# Patient Record
Sex: FEMALE | Race: WHITE
[De-identification: names, ages, dates, MRNs, and addresses within clinical notes are randomized per-mention and may not be internally consistent; named-entity substitution may affect disease eponyms.]

---

## 2017-04-29 ENCOUNTER — HOSPITAL ENCOUNTER (EMERGENCY)
Dept: HOSPITAL 45 - C.EDB | Age: 57
Discharge: HOME | End: 2017-04-29
Payer: COMMERCIAL

## 2017-04-29 VITALS
BODY MASS INDEX: 25.47 KG/M2 | HEIGHT: 69.02 IN | BODY MASS INDEX: 25.47 KG/M2 | WEIGHT: 171.96 LBS | WEIGHT: 171.96 LBS | HEIGHT: 69.02 IN

## 2017-04-29 VITALS — HEART RATE: 79 BPM | OXYGEN SATURATION: 96 % | SYSTOLIC BLOOD PRESSURE: 130 MMHG | DIASTOLIC BLOOD PRESSURE: 78 MMHG

## 2017-04-29 VITALS — OXYGEN SATURATION: 95 %

## 2017-04-29 VITALS — TEMPERATURE: 98.96 F

## 2017-04-29 DIAGNOSIS — R07.89: Primary | ICD-10-CM

## 2017-04-29 DIAGNOSIS — Z79.899: ICD-10-CM

## 2017-04-29 DIAGNOSIS — G89.29: ICD-10-CM

## 2017-04-29 DIAGNOSIS — Z82.49: ICD-10-CM

## 2017-04-29 DIAGNOSIS — M06.9: ICD-10-CM

## 2017-04-29 DIAGNOSIS — F17.200: ICD-10-CM

## 2017-04-29 DIAGNOSIS — N18.3: ICD-10-CM

## 2017-04-29 DIAGNOSIS — Z98.890: ICD-10-CM

## 2017-04-29 DIAGNOSIS — Z79.82: ICD-10-CM

## 2017-04-29 DIAGNOSIS — Z87.11: ICD-10-CM

## 2017-04-29 DIAGNOSIS — M54.9: ICD-10-CM

## 2017-04-29 DIAGNOSIS — Z79.52: ICD-10-CM

## 2017-04-29 LAB
ALP SERPL-CCNC: 67 U/L (ref 45–117)
ALT SERPL-CCNC: 21 U/L (ref 12–78)
ANION GAP SERPL CALC-SCNC: 6 MMOL/L (ref 3–11)
AST SERPL-CCNC: 15 U/L (ref 15–37)
BASOPHILS # BLD: 0.03 K/UL (ref 0–0.2)
BASOPHILS NFR BLD: 0.2 %
BUN SERPL-MCNC: 19 MG/DL (ref 7–18)
BUN/CREAT SERPL: 12.6 (ref 10–20)
CALCIUM SERPL-MCNC: 9.6 MG/DL (ref 8.5–10.1)
CHLORIDE SERPL-SCNC: 103 MMOL/L (ref 98–107)
CKMB/CK RATIO: 1.9 (ref 0–3)
CO2 SERPL-SCNC: 30 MMOL/L (ref 21–32)
COMPLETE: YES
CREAT CL PREDICTED SERPL C-G-VRATE: 44.8 ML/MIN
CREAT SERPL-MCNC: 1.5 MG/DL (ref 0.6–1.2)
EOSINOPHIL NFR BLD AUTO: 398 K/UL (ref 130–400)
GLUCOSE SERPL-MCNC: 114 MG/DL (ref 70–99)
HCT VFR BLD CALC: 45.7 % (ref 37–47)
IG%: 0.2 %
IMM GRANULOCYTES NFR BLD AUTO: 7.8 %
LYMPHOCYTES # BLD: 1.39 K/UL (ref 1.2–3.4)
MCH RBC QN AUTO: 30.7 PG (ref 25–34)
MCHC RBC AUTO-ENTMCNC: 32.8 G/DL (ref 32–36)
MCV RBC AUTO: 93.5 FL (ref 80–100)
MONOCYTES NFR BLD: 3.3 %
NEUTROPHILS # BLD AUTO: 0.4 %
NEUTROPHILS NFR BLD AUTO: 88.1 %
PMV BLD AUTO: 10.1 FL (ref 7.4–10.4)
POINT OF CARE TROPONIN I: 0 NG/ML (ref 0–0.04)
POTASSIUM SERPL-SCNC: 4.2 MMOL/L (ref 3.5–5.1)
PREG INTERNAL NEGATIVE QC: (no result)
PREG INTERNAL POSITIVE QC: (no result)
RBC # BLD AUTO: 4.89 M/UL (ref 4.2–5.4)
SODIUM SERPL-SCNC: 139 MMOL/L (ref 136–145)
WBC # BLD AUTO: 17.93 K/UL (ref 4.8–10.8)

## 2017-04-29 NOTE — DIAGNOSTIC IMAGING REPORT
CHEST CTA for AORTIC DISSECTION



CT DOSE: 595.23 mGy.cm



HISTORY: Right upper chest pain.     



TECHNIQUE: Multiaxial CT images of the chest were performed both before and

after the intravenous administration of contrast to evaluate the aorta. Maximal

intensity projection images were also obtained.



COMPARISON STUDY: Chest 4/29/2017.



FINDINGS: Normal caliber thoracic aorta with no evidence for dissection. Mild

atherosclerotic plaque within the thoracic aorta. The central pulmonary arteries

are patent. No pleural or pericardial effusions. The visualized liver, spleen,

and adrenal glands are unremarkable. Cholecystectomy. An 11 mm hypodense lesion

within the upper pole the left kidney. This favors a cyst. No mediastinal or

hilar lymphadenopathy. No fractures within the visualized osseous structures.

Approximately 80% stenosis at the proximal left subclavian artery due to the

atherosclerotic plaque. No pneumothorax. The central airways are patent. Mild

emphysema. A 7 mm groundglass nodule within the left upper lobe on image 142.

Groundglass densities at the lung bases favor mild dependent change. No focal

lung consolidations to suggest pneumonia. There are 2 adjacent 3 mm nodules

within the right lower lobe on image 171.



IMPRESSION: 



1. No evidence for an aortic dissection.

2. Mild emphysema.

3. A few subcentimeter indeterminate pulmonary nodules as described above.

Dominant 7 mm groundglass nodule seen within the left upper lobe. Please refer

to the chart below for recommended follow-up.

4. Approximately 80% stenosis at the proximal left subclavian artery due to the

atherosclerotic plaque. 



Please refer to below summary of Fleischner criteria recommendations for

follow-up of incidental CT nodules (SHANTELL Drake, Guidelines for management of

small pulmonary nodules detected on CT scans:  A statement from the Fleischner

Society, Radiology 237: [phone].)



SOLID NODULES



Solitary nodule size: <6 mm

*  Low risk patients:  no follow-up needed

*  high risk patients:  optional CT at 12 months



Solitary nodule size:  6-8 mm

*  Low risk patients:  follow-up at 6-12 months, then consider further follow-up

at 18-24 months

*  high risk patients:  initial follow-up CT at 6-12 months and then at 18-24

months if no change



Solitary nodule size: >8 mm

*  either low or high risk patients - consider follow-up CT at 3 months, and/or

CT-PET, and/or biopsy



Multiple nodules size:  <6 mm

*  Low risk patients:  no routine follow-up

*  high risk patients:  optional CT at 12 months



Multiple nodules size:  6-8 mm

*  Low risk patients:  follow-up at 3-6 months, then consider further follow-up

at 18-24 months

*  high risk patients:  follow-up at 3-6 months, then at 18-24 months if no

change



Multiple nodules size:  >8 mm

*  Low risk patients:  follow-up at 3-6 months, then consider further follow-up

at 18-24 months

*  high risk patients:  follow-up at 3-6 months, then at 18-24 months if no

change



Note:  newly detected indeterminate nodule in persons 35 years of age or older.

*  Low risk patients:  minimal or absent history of smoking and/or other known

risk factors

*  high risk patients:  history of smoking or of other known risk factors (e.g.

first degree relative with lung cancer, or exposure to asbestos, radon, uranium)

*  if a nodule up to 8 mm is partly solid or is ground glass further follow-up

is required after 24 months to exclude possible slow growing adenocarcinoma

(GALINDO)



SUBSOLID NODULES



Solitary pure ground-glass nodule

*  nodule size <6 mm - no CT follow-up required

*  nodule size >=6 mm - follow-up CT at 6-12 months, then every 2 years until 5

years



Solitary part-solid nodule

*  nodule size <6 mm - no CT follow-up required

*  nodule size >=6 mm - follow-up CT at 3-6 months.  If unchanged, and solid

component remains <6 mm, then annual follow-up for 5 years



Multiple subsolid nodules

*  nodule size <6 mm - follow-up CT at 3-6 months, consider further follow-up at

2 and 4 years if stable

*  nodule size >=6 mm - follow-up CT at 3-6 months, subsequent management based

on the most suspicious nodule(s)







       







Electronically signed by:  Jim Martinez M.D.

4/29/2017 5:15 PM



Dictated Date/Time:  4/29/2017 5:06 PM

## 2017-04-29 NOTE — DIAGNOSTIC IMAGING REPORT
CHEST ONE VIEW PORTABLE



HISTORY: Atypical  CHEST PAIN



COMPARISON: Chest 2/7/2015.



FINDINGS: The lungs are clear. Cardiac silhouette is normal in size. No pleural

effusions. No pneumothorax.



IMPRESSION:

No acute process.







Electronically signed by:  Jim Martinez M.D.

4/29/2017 3:15 PM



Dictated Date/Time:  4/29/2017 3:14 PM

## 2017-04-29 NOTE — EMERGENCY ROOM VISIT NOTE
History


Report prepared by Shruthi:  Minoo Chopra


Under the Supervision of:  Dr. Cecilio Richardson M.D.


First contact with patient:  14:58


Chief Complaint:  CHEST PAIN


Stated Complaint:  CHEST PAIN RADIATIING IN BACK RIGHT SHOULDER AND N


Nursing Triage Summary:  


Chest pain radiating through to back.  Patient was seen at [facility] ER on 


Thursday. The pain has been present since Wednesday and it has really never 


really gone away.





History of Present Illness


The patient is a 56 year old female who presents to the Emergency Room with 

complaints of constant right-sided chest pain for the past 4 days. She was 

evaluated 2 days ago in the ER at [facility] for this pain. She had 

negative testing for blood clots and was diagnosed with indigestion and 

discharged. She states that her pain has continued since that time. It has not 

worsened, but has been persistent at about a 7/10 in severity. Her pain is 

exacerbated with deep breathing and lying flat. She states that her pain goes 

into her back and radiates up the right side of her neck. She has never 

experienced pain like this before. She was given Toradol while in [place] 

and states that helped to alleviate her pain for a short time. She takes 40mg 

of oxycodone daily for chronic back pain. Pt denies LOC, headache, fevers, 

chills, diaphoresis, visual changes, tearing pain radiating to the back, 

personal history or family history of aneurysm or pulmonary embolism, 

uncontrolled hypertension, breathing difficulties, leg swelling, coagulation 

abnormalities, prolonged travel, recent surgery or immobilization, nausea, 

vomiting, abdominal pain, melena, hematochezia, urinary symptoms, numbness, 

weakness, lymphadenopathy, rash, or other complaints.





   Source of History:  patient, family (daughter)


   Onset:  4 days ago


   Position:  chest (right)


   Symptom Intensity:  7/10


   Quality:  other (radiating)


   Timing:  constant


   Modifying Factors (Worsening):  breathing, other (lying flat)


   Modifying Factors (Relieving):  other (Toradol)





Review of Systems


See HPI for pertinent positives and negatives.  A total of ten systems were 

reviewed and were otherwise negative.





Past Medical & Surgical


Medical Problems:


(1) Abdominal aortic aneurysm without rupture


(2) C. difficile colitis


(3) Chronic headache disorder


(4) Chronic renal failure, stage 3 (moderate)


(5) COPD exacerbation


(6) Diverticulosis of colon without diverticulitis


(7) History of - peptic ulcer


(8) Hypercholesterolemia


(9) L5 disc disease


(10) Lumbar vertebral fracture


(11) Nicotine dependence


(12) Rheumatoid arthritis


Surgical Problems:


(1) History of endovascular stent graft for abdominal aortic aneurysm (AAA)








Family History





Cancer


Gallbladder disease


Heart disease


Hypertension


Lung disease





Social History


Smoking Status:  Current Every Day Smoker


Alcohol Use:  none


Drug Use:  none


Marital Status:  


Housing Status:  lives with family


Occupation Status:  employed





Current/Historical Medications


Scheduled


Aspirin (Aspirin Ec), 81 MG PO DAILY


Ginger (Zingiber Officinalis) (Ginger Root), 1,100 MG PO DAILY


Lisinopril (Zestril), 5 MG PO DAILY


Oxycodone Hcl (Oxycodone Hcl), 1 TAB PO QID


Prednisone (Prednisone), 10 MG PO DAILY


Ranitidine (Zantac), 150 MG PO DAILY





Allergies


Coded Allergies:  


     Codeine (Verified  Allergy, Severe, Difficulty Breathing, 4/29/17)


     Adalimumab (Verified  Allergy, Intermediate, Rash, 4/29/17)


     Penicillins (Verified  Allergy, Intermediate, Rash, 4/29/17)





Physical Exam


Vital Signs











  Date Time  Temp Pulse Resp B/P Pulse Ox O2 Delivery O2 Flow Rate FiO2


 


4/29/17 17:42  79 18 130/78 96 Room Air  


 


4/29/17 16:05  90 20 114/74 95 Room Air  


 


4/29/17 15:20     95 Room Air  


 


4/29/17 14:33  94      


 


4/29/17 14:32     94 Room Air  


 


4/29/17 14:24     95 Room Air  


 


4/29/17 14:15 37.2 97 20 148/84 95 Room Air  











Physical Exam


GENERAL: Awake, alert, well-appearing, in no distress


HENT: Normocephalic, atraumatic. Oropharynx unremarkable.


EYES: Normal conjunctiva. Sclera non-icteric.


NECK: Supple. No nuchal rigidity. FROM. No JVD.


RESPIRATORY: Clear to auscultation.


CARDIAC: Regular rate, normal rhythm. Extremities warm and well perfused. 

Pulses equal.


ABDOMEN: Soft, non-distended. No tenderness to palpation. No rebound or 

guarding. No masses.


RECTAL: Deferred.


MUSCULOSKELETAL: Chest examination reveals no tenderness. The back is 

symmetrical on inspection without obvious abnormality. There is no CVA 

tenderness to palpation. No joint edema. Sore to touch over the right posterior 

lateral ribs. 


LOWER EXTREMITIES: Calves are equal size bilaterally and non-tender. No edema. 

No discoloration. 


NEURO: Normal sensorium. No sensory or motor deficits noted. 


SKIN: No rash or jaundice noted.





Medical Decision & Procedures


ER Provider


Diagnostic Interpretation:


Radiology results as stated below per my review and radiologist interpretation:





CHEST ONE VIEW PORTABLE





HISTORY: Atypical  CHEST PAIN





COMPARISON: Chest 2/7/2015.





FINDINGS: The lungs are clear. Cardiac silhouette is normal in size. No pleural


effusions. No pneumothorax.





IMPRESSION:


No acute process.











Electronically signed by:  Jim Martinez M.D.


4/29/2017 3:15 PM





Dictated Date/Time:  4/29/2017 3:14 PM








CHEST CTA for AORTIC DISSECTION





CT DOSE: 595.23 mGy.cm





HISTORY: Right upper chest pain.     





TECHNIQUE: Multiaxial CT images of the chest were performed both before and


after the intravenous administration of contrast to evaluate the aorta. Maximal


intensity projection images were also obtained.





COMPARISON STUDY: Chest 4/29/2017.





FINDINGS: Normal caliber thoracic aorta with no evidence for dissection. Mild


atherosclerotic plaque within the thoracic aorta. The central pulmonary arteries


are patent. No pleural or pericardial effusions. The visualized liver, spleen,


and adrenal glands are unremarkable. Cholecystectomy. An 11 mm hypodense lesion


within the upper pole the left kidney. This favors a cyst. No mediastinal or


hilar lymphadenopathy. No fractures within the visualized osseous structures.


Approximately 80% stenosis at the proximal left subclavian artery due to the


atherosclerotic plaque. No pneumothorax. The central airways are patent. Mild


emphysema. A 7 mm groundglass nodule within the left upper lobe on image 142.


Groundglass densities at the lung bases favor mild dependent change. No focal


lung consolidations to suggest pneumonia. There are 2 adjacent 3 mm nodules


within the right lower lobe on image 171.





IMPRESSION: 





1. No evidence for an aortic dissection.


2. Mild emphysema.


3. A few subcentimeter indeterminate pulmonary nodules as described above.


Dominant 7 mm groundglass nodule seen within the left upper lobe. Please refer


to the chart below for recommended follow-up.


4. Approximately 80% stenosis at the proximal left subclavian artery due to the


atherosclerotic plaque. 





Please refer to below summary of Fleischner criteria recommendations for


follow-up of incidental CT nodules (SHANTELL Drake, Guidelines for management of


small pulmonary nodules detected on CT scans:  A statement from the Fleischner


Society, Radiology 237: [phone].)





SOLID NODULES





Solitary nodule size: <6 mm


*  Low risk patients:  no follow-up needed


*  high risk patients:  optional CT at 12 months





Solitary nodule size:  6-8 mm


*  Low risk patients:  follow-up at 6-12 months, then consider further follow-up


at 18-24 months


*  high risk patients:  initial follow-up CT at 6-12 months and then at 18-24


months if no change





Solitary nodule size: >8 mm


*  either low or high risk patients - consider follow-up CT at 3 months, and/or


CT-PET, and/or biopsy





Multiple nodules size:  <6 mm


*  Low risk patients:  no routine follow-up


*  high risk patients:  optional CT at 12 months





Multiple nodules size:  6-8 mm


*  Low risk patients:  follow-up at 3-6 months, then consider further follow-up


at 18-24 months


*  high risk patients:  follow-up at 3-6 months, then at 18-24 months if no


change





Multiple nodules size:  >8 mm


*  Low risk patients:  follow-up at 3-6 months, then consider further follow-up


at 18-24 months


*  high risk patients:  follow-up at 3-6 months, then at 18-24 months if no


change





Note:  newly detected indeterminate nodule in persons 35 years of age or older.


*  Low risk patients:  minimal or absent history of smoking and/or other known


risk factors


*  high risk patients:  history of smoking or of other known risk factors (e.g.


first degree relative with lung cancer, or exposure to asbestos, radon, uranium)


*  if a nodule up to 8 mm is partly solid or is ground glass further follow-up


is required after 24 months to exclude possible slow growing adenocarcinoma


(BAC)





SUBSOLID NODULES





Solitary pure ground-glass nodule


*  nodule size <6 mm - no CT follow-up required


*  nodule size >=6 mm - follow-up CT at 6-12 months, then every 2 years until 5


years





Solitary part-solid nodule


*  nodule size <6 mm - no CT follow-up required


*  nodule size >=6 mm - follow-up CT at 3-6 months.  If unchanged, and solid


component remains <6 mm, then annual follow-up for 5 years





Multiple subsolid nodules


*  nodule size <6 mm - follow-up CT at 3-6 months, consider further follow-up at


2 and 4 years if stable


*  nodule size >=6 mm - follow-up CT at 3-6 months, subsequent management based


on the most suspicious nodule(s)











       











Electronically signed by:  Jim Martinez M.D.


4/29/2017 5:15 PM





Dictated Date/Time:  4/29/2017 5:06 PM





Laboratory Results


4/29/17 14:30








Red Blood Count 4.89, Mean Corpuscular Volume 93.5, Mean Corpuscular Hemoglobin 

30.7, Mean Corpuscular Hemoglobin Concent 32.8, Mean Platelet Volume 10.1, 

Neutrophils (%) (Auto) 88.1, Lymphocytes (%) (Auto) 7.8, Monocytes (%) (Auto) 

3.3, Eosinophils (%) (Auto) 0.4, Basophils (%) (Auto) 0.2, Neutrophils # (Auto) 

15.81, Lymphocytes # (Auto) 1.39, Monocytes # (Auto) 0.59, Eosinophils # (Auto) 

0.07, Basophils # (Auto) 0.03





4/29/17 14:30

















Test


  4/29/17


14:30 4/29/17


15:03


 


White Blood Count


  17.93 K/uL


(4.8-10.8) 


 


 


Red Blood Count


  4.89 M/uL


(4.2-5.4) 


 


 


Hemoglobin


  15.0 g/dL


(12.0-16.0) 


 


 


Hematocrit 45.7 % (37-47)  


 


Mean Corpuscular Volume


  93.5 fL


() 


 


 


Mean Corpuscular Hemoglobin


  30.7 pg


(25-34) 


 


 


Mean Corpuscular Hemoglobin


Concent 32.8 g/dl


(32-36) 


 


 


Platelet Count


  398 K/uL


(130-400) 


 


 


Mean Platelet Volume


  10.1 fL


(7.4-10.4) 


 


 


Neutrophils (%) (Auto) 88.1 %  


 


Lymphocytes (%) (Auto) 7.8 %  


 


Monocytes (%) (Auto) 3.3 %  


 


Eosinophils (%) (Auto) 0.4 %  


 


Basophils (%) (Auto) 0.2 %  


 


Neutrophils # (Auto)


  15.81 K/uL


(1.4-6.5) 


 


 


Lymphocytes # (Auto)


  1.39 K/uL


(1.2-3.4) 


 


 


Monocytes # (Auto)


  0.59 K/uL


(0.11-0.59) 


 


 


Eosinophils # (Auto)


  0.07 K/uL


(0-0.5) 


 


 


Basophils # (Auto)


  0.03 K/uL


(0-0.2) 


 


 


RDW Standard Deviation


  48.3 fL


(36.4-46.3) 


 


 


RDW Coefficient of Variation


  14.2 %


(11.5-14.5) 


 


 


Immature Granulocyte % (Auto) 0.2 %  


 


Immature Granulocyte # (Auto)


  0.04 K/uL


(0.00-0.02) 


 


 


Anion Gap


  6.0 mmol/L


(3-11) 


 


 


Est Creatinine Clear Calc


Drug Dose 44.8 ml/min 


  


 


 


Estimated GFR (


American) 45.3 


  


 


 


Estimated GFR (Non-


American 39.1 


  


 


 


BUN/Creatinine Ratio 12.6 (10-20)  


 


Calcium Level


  9.6 mg/dl


(8.5-10.1) 


 


 


Total Bilirubin


  0.4 mg/dl


(0.2-1) 


 


 


Direct Bilirubin


  < 0.1 mg/dl


(0-0.2) 


 


 


Aspartate Amino Transf


(AST/SGOT) 15 U/L (15-37) 


  


 


 


Alanine Aminotransferase


(ALT/SGPT) 21 U/L (12-78) 


  


 


 


Alkaline Phosphatase


  67 U/L


() 


 


 


Total Creatine Kinase


  74 U/L


() 


 


 


Creatine Kinase MB


  1.4 ng/ml


(0.5-3.6) 


 


 


Creatine Kinase MB Ratio 1.9 (0-3.0)  


 


Total Protein


  7.5 gm/dl


(6.4-8.2) 


 


 


Albumin


  3.7 gm/dl


(3.4-5.0) 


 


 


Lipase


  292 U/L


() 


 


 


Human Chorionic Gonadotropin,


Qual NEG (NEG) 


  


 


 


Bedside D-Dimer


  


  > 450 ng/mlFEU


(0-450)


 


Bedside Troponin I


  


  0.000 ng/ml


(0-0.045)





Laboratory results reviewed by me





ECG


Indication:  chest pain


Rate (beats per minute):  86


Rhythm:  normal sinus


Findings:  Q waves (Septal), no acute ischemic change, no ectopy





ED Course


1501: The patient was evaluated in room C3. A complete history and physical 

exam was performed.





1558: I reassessed the patient at this time. She is doing well and resting 

comfortably. I informed her about her thoracic aortic aneurysm that was found 

at [place]. 





1729: I reassessed the patient at this time. She is feeling better and resting 

comfortably. I discussed the results and treatment plan with the patient. I 

answered all pertaining questions that she had. She expressed understanding and 

verbalized agreement. The patient will be discharged home.





Medical Decision


Triage Nursing notes reviewed.


The patient's presentation and history were concerning for chest pain.  





Etiologies such as cardiac ischemia, aortic dissection, pulmonary embolism, 

pneumonia, pneumothorax, musculoskeletal, infections, gastrointestinal, as well 

as others were entertained.  





The patient was evaluated.  She had right-sided chest discomfort for 4 days.  

It was constant.  She has this despite taking oxycodone for her chronic back 

pain.  The patient notes a workup done 2 days ago at the [facility].  

She had a CT performed and was told it was negative.  ECG was nonischemic.  The 

patient had blood work obtained.  She had an unremarkable chemistry panel.  CBC 

was concerning for leukocytosis.  Record was obtained from the [place] ED 

visit.  This patient had serial troponins that were reported as negative.  Her 

CT scan did not reveal any obvious PE.  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  The patient had  an elevated leukocytosis.  The 

patient's record review reveals she has had a leukocytosis pre-much 

consistently.  The patient is on prednisone as well.  She has rheumatoid.  Her 

CBC and chemistry panel were otherwise unremarkable.  The patient does have an 

elevated d-dimer.  This does raise some concerns although it is difficult to 

interpret in light of her recent negative PE study and her rheumatoid 

arthritis.  Because of the questionable aortic issues and her history of AAA 

the patient underwent CT scan for dissection.  No significant aortic issues 

were seen.  The patient does have a pulmonologist.  She was counseled on this.  

The patient has had constant pain for 4 days.  She had serial troponins a 

[place] or negative.  She had a troponin here that was negative.  This does 

not appear to be consistent with cardiac chest pain.  She has no findings to 

support aortic pathology.  There is no pneumothorax or pneumonia.  The patient 

did have some tenderness in her right posterior lateral ribs.  This may be 

musculoskeletal.  The patient does have pain issues and is on chronic pain 

medication.  She did not want pain medication here.  After discussing all of 

the findings with her, the patient was satisfied with the results and feels 

comfortable with conservative outpatient management.  I spent significant 

amount of time talking with her and she was comfortable.  I gave my usual and 

customary discussion regarding this issue.





By the evaluation outlined above other emergent etiologies such as those listed 

in the differential, as well as others, were deemed relatively unlikely.  





The patient and family were informed about the findings as listed above.  All 

questions were answered and they were pleased with the treatment.  Return 

instructions were outlined and the patient was discharged in stable condition.  





The patient was referred to her PCP for follow-up Monday for a recheck of the 

current condition.





The chart was completed utilizing Dragon Speech voice recognition software.   

Grammatical errors, random word insertions, pronoun errors, and incomplete 

sentences are an occasional consequence of this system due to software 

limitations, ambient noise, and hardware issues.  Any formal questions or 

concerns about the content, text, or information contained within the body of 

this dictation should be directly addressed to the physician for clarification.





Impression





 Primary Impression:  


 Right-sided chest pain





Scribe Attestation


The scribe's documentation has been prepared under my direction and personally 

reviewed by me in its entirety. I confirm that the note above accurately 

reflects all work, treatment, procedures, and medical decision making performed 

by me.





Departure Information


Dispostion


Home / Self-Care





Referrals


Avery Merrill M.D. (PCP)





Forms


HOME CARE DOCUMENTATION FORM,                                                 

               IMPORTANT VISIT INFORMATION





Patient Instructions


My [facility]





Additional Instructions





CHEST PAIN INSTRUCTIONS:





Warm compresses for 20 minutes at a time four times daily for 2-3 days.





Rest and drink plenty of fluids as tolerated.





Continue current medications.





Avoid strenuous activities and anything that worsens your pain.  Resume normal 

activities once your symptoms resolve.    





Return to the ER immediately for worsening or persistent chest pain, abdominal 

pain, vomiting, fevers, chest pains, difficulty breathing, worsening of your 

condition, or as needed.





Follow up with your primary physician in 2-3 days for a recheck of your current 

condition.  





A follow-up chest CT will be necessary in 6 months as discussed.  You will also 

need another in 12 months after that.  This   can be coordinated with your 

primary office as pulmonary nodules were seen and being a smoker this puts you 

at high risk.  If these develop further specialty referral will be necessary to 

rule out cancer.

## 2018-04-19 ENCOUNTER — HOSPITAL ENCOUNTER (EMERGENCY)
Dept: HOSPITAL 45 - C.EDB | Age: 58
Discharge: HOME | End: 2018-04-19
Payer: COMMERCIAL

## 2018-04-19 VITALS — OXYGEN SATURATION: 96 % | HEART RATE: 96 BPM | DIASTOLIC BLOOD PRESSURE: 101 MMHG | SYSTOLIC BLOOD PRESSURE: 153 MMHG

## 2018-04-19 VITALS
HEIGHT: 67.99 IN | HEIGHT: 67.99 IN | WEIGHT: 171.96 LBS | WEIGHT: 171.96 LBS | BODY MASS INDEX: 26.06 KG/M2 | BODY MASS INDEX: 26.06 KG/M2

## 2018-04-19 VITALS — TEMPERATURE: 99.14 F
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
## 2018-04-19 DIAGNOSIS — Z79.899: ICD-10-CM

## 2018-04-19 DIAGNOSIS — Z79.82: ICD-10-CM

## 2018-04-19 DIAGNOSIS — F17.200: ICD-10-CM

## 2018-04-19 DIAGNOSIS — Z82.49: ICD-10-CM

## 2018-04-19 DIAGNOSIS — Z83.6: ICD-10-CM

## 2018-04-19 DIAGNOSIS — Z88.8: ICD-10-CM

## 2018-04-19 DIAGNOSIS — D72.829: ICD-10-CM

## 2018-04-19 DIAGNOSIS — J44.9: ICD-10-CM

## 2018-04-19 DIAGNOSIS — H53.8: ICD-10-CM

## 2018-04-19 DIAGNOSIS — R39.9: ICD-10-CM

## 2018-04-19 DIAGNOSIS — Z88.0: ICD-10-CM

## 2018-04-19 DIAGNOSIS — Z83.79: ICD-10-CM

## 2018-04-19 DIAGNOSIS — Z88.6: ICD-10-CM

## 2018-04-19 DIAGNOSIS — R51: ICD-10-CM

## 2018-04-19 DIAGNOSIS — N18.3: ICD-10-CM

## 2018-04-19 DIAGNOSIS — S22.000A: Primary | ICD-10-CM

## 2018-04-19 DIAGNOSIS — G89.29: ICD-10-CM

## 2018-04-19 DIAGNOSIS — R05: ICD-10-CM

## 2018-04-19 LAB
ALBUMIN SERPL-MCNC: 3.8 GM/DL (ref 3.4–5)
ALP SERPL-CCNC: 58 U/L (ref 45–117)
ALT SERPL-CCNC: 62 U/L (ref 12–78)
AST SERPL-CCNC: 45 U/L (ref 15–37)
BASOPHILS # BLD: 0.03 K/UL (ref 0–0.2)
BASOPHILS NFR BLD: 0.2 %
BUN SERPL-MCNC: 16 MG/DL (ref 7–18)
CALCIUM SERPL-MCNC: 9.7 MG/DL (ref 8.5–10.1)
CO2 SERPL-SCNC: 28 MMOL/L (ref 21–32)
CREAT SERPL-MCNC: 1.28 MG/DL (ref 0.6–1.2)
EOS ABS #: 0.02 K/UL (ref 0–0.5)
EOSINOPHIL NFR BLD AUTO: 381 K/UL (ref 130–400)
GLUCOSE SERPL-MCNC: 103 MG/DL (ref 70–99)
HCT VFR BLD CALC: 46.4 % (ref 37–47)
HGB BLD-MCNC: 15.3 G/DL (ref 12–16)
IG#: 0.05 K/UL (ref 0–0.02)
IMM GRANULOCYTES NFR BLD AUTO: 14.6 %
LIPASE: 166 U/L (ref 73–393)
LYMPHOCYTES # BLD: 2.46 K/UL (ref 1.2–3.4)
MCH RBC QN AUTO: 30.3 PG (ref 25–34)
MCHC RBC AUTO-ENTMCNC: 33 G/DL (ref 32–36)
MCV RBC AUTO: 91.9 FL (ref 80–100)
MONO ABS #: 0.52 K/UL (ref 0.11–0.59)
MONOCYTES NFR BLD: 3.1 %
NEUT ABS #: 13.73 K/UL (ref 1.4–6.5)
NEUTROPHILS # BLD AUTO: 0.1 %
NEUTROPHILS NFR BLD AUTO: 81.7 %
PMV BLD AUTO: 9.5 FL (ref 7.4–10.4)
POTASSIUM SERPL-SCNC: 4 MMOL/L (ref 3.5–5.1)
PROT SERPL-MCNC: 7.8 GM/DL (ref 6.4–8.2)
RED CELL DISTRIBUTION WIDTH CV: 14.3 % (ref 11.5–14.5)
RED CELL DISTRIBUTION WIDTH SD: 48 FL (ref 36.4–46.3)
SODIUM SERPL-SCNC: 138 MMOL/L (ref 136–145)
WBC # BLD AUTO: 16.81 K/UL (ref 4.8–10.8)

## 2018-04-19 NOTE — DIAGNOSTIC IMAGING REPORT
ABDOMEN AND PELVIS CT WITH IV CONTRAST



CT DOSE: 780.78 mGycm



HISTORY: Acute lower back pain with bilateral flank pain  lower back/flank pain 



TECHNIQUE: Multiaxial CT images of the abdomen and pelvis were performed

following the use of intravenous contrast.  A dose lowering technique was

utilized adhering to the principles of ALARA.



COMPARISON STUDY: CT abdomen and pelvis 11/2/2011.



FINDINGS: 

Mild dependent subsegmental bibasilar atelectasis. There are a few solid

pulmonary nodules of the right middle lobe measuring up to 2 mm. No pneumatosis

or pneumoperitoneum identified. Imaged inferior cardiac chambers are

unremarkable. Mitral annular calcifications.



Prior cholecystectomy. Mild intrahepatic biliary ductal dilation, likely

secondary to postcholecystectomy state. 5 mm low attenuating lesion of the

lateral left hepatic lobe is too small to characterize however suggests hepatic

cyst. Spleen, pancreas and adrenal glands are within normal limits. Low

attenuating lesions of the kidneys bilaterally are seen measuring up to 1.7 cm

within the superior pole left kidney suggesting cysts. Parenchymal thinning with

cortical scarring is noted within the inferior pole left kidney. No renal

calculi or hydronephrosis. Ureters and urinary bladder are within normal limits.

Uterus and adnexa are also unremarkable. Extensive mixed plaquing of the

abdominal aorta. Aneurysmal dilation of the abdominal aorta level of the kidneys

measures 2.7 x 3.3 cm. Aortobiiliac stent graft is noted within a fusiform

infrarenal abdominal aortic aneurysm measuring 3.0 cm. The right common iliac

artery is dilated measuring 3.3 cm. The left is dilated measuring 2.7 cm. Normal

caliber iliac vessels seen on comparison from 2011. No bulky adenopathy.



No bowel obstruction or focal bowel wall thickening identified. Colonic

diverticulosis without evidence of acute diverticulitis. Normal appendix.

Scattered air-fluid levels within nondilated loops of small bowel are likely

physiologic. Mild asymmetric atrophy of the right rectus musculature. Bones

appear intact. No compression deformity with kyphoplasty changes at L2.

Discectomy with posterior interbody kateryna and screw fusion at L4-L5.



IMPRESSION: 



1. No acute intra-abdominal or intrapelvic abnormality identified. No bowel

obstruction or focal bowel wall thickening.

2. Prior cholecystectomy.

3. Fusiform aneurysmal dilation of the suprarenal abdominal aorta measures up to

3.3 cm. Aortobiiliac stent graft is in place with progressive dilation of the

bilateral common iliac arteries, right greater than left as above. 

4. Colonic diverticulosis without evidence of acute diverticulitis.

5. Additional findings as above.







Electronically signed by:  Danish Olmos M.D.

4/19/2018 6:53 PM



Dictated Date/Time:  4/19/2018 6:44 PM

## 2018-04-19 NOTE — DIAGNOSTIC IMAGING REPORT
THORACIC SPINE 3 VIEWS ROUTINE



CLINICAL HISTORY: 57 years-old Female presenting with mid back pain. 



TECHNIQUE: 3 views of the thoracic spine were obtained. 



COMPARISON: CT from 4/29/2017.



FINDINGS:

No scoliosis. Normal thoracic kyphosis. Visualization of the upper thoracic

spine is slightly limited due to overlapping structures. Allowing for this,

vertebral bodies maintain normal height and alignment. Intervertebral disc

heights preserved. Degenerative changes evidenced by osteophytosis noted at the

thoracolumbar junction. Mild compression deformity in a mid thoracic vertebral

body with approximately 25% anterior vertebral body height loss. Visualized

portion of the cervical spine normal. Visualized portion of the thorax normal.



IMPRESSION:

Approximately 25% anterior vertebral body height loss of a midthoracic level

concerning for compression deformity. This is new since the prior CT.



The report will be called/faxed according to standard departmental protocol.







Electronically signed by:  Gab Eller M.D.

4/19/2018 5:46 PM



Dictated Date/Time:  4/19/2018 5:43 PM

## 2018-04-19 NOTE — DIAGNOSTIC IMAGING REPORT
CHEST ONE VIEW PORTABLE



CLINICAL HISTORY: 57 years-old Female presenting with cough. 



TECHNIQUE: Portable upright AP view of the chest was obtained.



COMPARISON: 4/29/2017.



FINDINGS:

Atherosclerosis of the aortic arch. Cardiac silhouette normal in size. Lungs and

pleural spaces clear. Osseous structures normal. Upper abdomen normal.



IMPRESSION:

1.  No acute cardiopulmonary disease.







Electronically signed by:  Gab Eller M.D.

4/19/2018 7:28 PM



Dictated Date/Time:  4/19/2018 7:27 PM

## 2018-04-19 NOTE — DIAGNOSTIC IMAGING REPORT
LUMBAR SPINE WITHOUT



CLINICAL HISTORY: 57 years-old Female presenting with lower back/ flank pain . 



TECHNIQUE: Multidetector CT of the lumbar spine was performed without the use of

intravenous contrast. IV contrast: None. A dose lowering technique was used

consistent with the principles of ALARA (as low as reasonably achievable).



COMPARISON: MR of the lumbar spine April 2012.



CT DOSE (mGy.cm): The estimated cumulative dose is 780.70.



FINDINGS:



 topogram: Cholecystectomy clips. Aortic endograft stent. Kyphoplasty.

Posterior lumbar fusion at L4-5.



Postsurgical changes of bilateral transpedicular screw and kateryna fixation of L4-5

with an interbody spacer. No hardware complication. Post procedural changes of

kyphoplasty of L2 with a chronic compression deformity. Minimal retropulsion of

the superior aspect of the L2 vertebral body without significant osseous spinal

canal narrowing. No scoliosis. Normal lumbar lordosis is essentially preserved.

No acute compression deformity or subluxation. Mild degenerative changes in the

lower lumbar spine. The spinal canal appears narrowed at L3-4 secondary to disc

bulge and epidural fat prominence. Neural foraminal narrowing suggested at

L5-S1.



Sacrum intact.



Visualized soft tissues demonstrate cortical atrophy of the lower pole of the

left kidney as well as a left renal cyst. An aortobiiliac endograft stent is

patent. Aneurysmal dilatation of the bilateral common iliac arteries, which

measure 3.4 cm on the right and 3.1 cm on the left. Paraspinal musculature

within normal limits.



IMPRESSION:

1.  No acute osseous injury of the lumbar spine.



2.  Postsurgical changes of posterior fusion of L4-5.



3.  Kyphoplasty changes of L2 with a chronic compression deformity.



4.  Within limitations of CT, suggestion of spinal canal narrowing at L3-4.



5.  Neural foraminal narrowing suggested at L5-S1.







Electronically signed by:  Gab Eller M.D.

4/19/2018 7:27 PM



Dictated Date/Time:  4/19/2018 7:22 PM

## 2018-04-19 NOTE — EMERGENCY ROOM VISIT NOTE
History


Report prepared by Shruthi:  Jabier Torres


Under the Supervision of:  Dr. Josep Simons D.O.


First contact with patient:  16:58


Chief Complaint:  BACK PAIN


Stated Complaint:  LOWER BACK PAIN, HEADACHE, NAUSEA





History of Present Illness


The patient is a 57 year old female who presents to the Emergency Room with 

complaints of "continuously worsening" lower back pain which she deals with 

chronically. The patient has been experiencing the acute worsening  of her pain 

for the past week or so. She describes the pain as an "ache" and "burn" across 

her whole lower back. The pain is worsened with "every move I make."  Pain 

tracks from the thoracic region to her lower lumbar.  There is no numbness in 

the left groin and she has normal control of her bowels and bladder. This is 

the patient's third visit to an ER, as she has been to the [facility] 

twice prior to this visit. She has had multiple abdominal CTs and blood work 

performed which were all normal. She is also currently complaining of blurred 

vision and a headache, as well as some unusual "pressure" with urination. She 

did have some sort of infection, which she states was "an infection in my blood

" and was placed on an Antibiotic. She did not have an inpatient 

hospitalization. She denies any fevers. 





The patient stated that she has had leukocytosis for 10+ years. [facility] 

[facility]





   Source of History:  patient


   Onset:  Chronic


   Position:  back (lower)


   Quality:  ache, burning


   Timing:  worsening


   Modifying Factors (Worsening):  other (Every movement)


   Associated Symptoms:  + headache, + urinary symptoms ("pressure")


Note:


Pt complains of blurred vision.





Review of Systems


See HPI for pertinent positives & negatives. A total of 10 systems reviewed and 

were otherwise negative.





Past Medical & Surgical


Medical Problems:


(1) Abdominal aortic aneurysm without rupture


(2) C. difficile colitis


(3) Chronic headache disorder


(4) Chronic renal failure, stage 3 (moderate)


(5) COPD exacerbation


(6) Diverticulosis of colon without diverticulitis


(7) History of - peptic ulcer


(8) Hypercholesterolemia


(9) L5 disc disease


(10) Lumbar vertebral fracture


(11) Nicotine dependence


(12) Rheumatoid arthritis


Surgical Problems:


(1) History of endovascular stent graft for abdominal aortic aneurysm (AAA)








Family History





Cancer


Gallbladder disease


Heart disease


Hypertension


Lung disease





Social History


Smoking Status:  Current Every Day Smoker


Alcohol Use:  none


Drug Use:  none


Marital Status:  


Housing Status:  lives with family


Occupation Status:  employed





Current/Historical Medications


Scheduled


Aspirin (Aspirin Ec), 81 MG PO DAILY


Deisy (Zingiber Officinalis) (Deisy Root), 1,100 MG PO DAILY


Lisinopril (Zestril), 5 MG PO DAILY


Oxycodone Hcl (Oxycodone Hcl), 1 TAB PO QID


Prednisone (Prednisone), 10 MG PO DAILY


Ranitidine (Zantac), 150 MG PO DAILY





Allergies


Coded Allergies:  


     Codeine (Verified  Allergy, Severe, Difficulty Breathing, 4/19/18)


     Adalimumab (Verified  Allergy, Intermediate, Rash, 4/19/18)


     Penicillins (Verified  Allergy, Intermediate, Rash, 4/19/18)





Physical Exam


Vital Signs











  Date Time  Temp Pulse Resp B/P (MAP) Pulse Ox O2 Delivery O2 Flow Rate FiO2


 


4/19/18 20:13  96 20 153/101 96   


 


4/19/18 18:49  86 18 138/84 96 Room Air  


 


4/19/18 16:56 37.3 114 20 108/70 96 Room Air  


 


4/19/18 15:17 36.9 110 20 112/79 96 Room Air  











Physical Exam


GENERAL: Sitting up in bed, alert, chronically-ill appearing, well nourished, 

disheveled, non-toxic 


EYE EXAM: normal conjunctiva. 


OROPHARYNX: no exudate, no erythema, lips, buccal mucosa, and tongue normal and 

mucous membranes are moist


NECK: supple, no nuchal rigidity, no adenopathy, non-tender


LUNGS: Clear to auscultation. Normal chest wall mechanics


HEART: no murmurs, S1 normal and S2 normal 


ABDOMEN: abdomen soft, with faint tenderness in the left mid abdomen. normo-

active bowel sounds, no masses, no rebound or guarding. 


BACK: Back is symmetrical on inspection and there is no deformity, there is an 

old midline incisional scar in the lower lumbar region with midline and 

paraspinal tenderness. This is worse on the left. 


SKIN: no rashes and no bruising 


UPPER EXTREMITIES: upper extremities are grossly normal. 


LOWER EXTREMITIES: No pitting edema. Flexion and extension of the hip/knee/

ankle and EHL are 5/5 bilaterally. Gross sensation is intact. DPs are 2/4, 

patellar and Achilles reflexes are 1/4.  Ambulates without difficulty


NEURO EXAM: Normal sensorium, cranial nerves II-XII grossly intact, normal 

speech,  no gross weakness of arms.





Medical Decision & Procedures


ER Provider


Diagnostic Interpretation:


Radiology results as stated below per my review and the radiologist's 

interpretation: 





CHEST ONE VIEW PORTABLE





CLINICAL HISTORY: 57 years-old Female presenting with cough. 





TECHNIQUE: Portable upright AP view of the chest was obtained.





COMPARISON: 4/29/2017.





FINDINGS:


Atherosclerosis of the aortic arch. Cardiac silhouette normal in size. Lungs and


pleural spaces clear. Osseous structures normal. Upper abdomen normal.





IMPRESSION:


1.  No acute cardiopulmonary disease.











Electronically signed by:  Gab Eller M.D.


4/19/2018 7:28 PM





Dictated Date/Time:  4/19/2018 7:27 PM








ABDOMEN AND PELVIS CT WITH IV CONTRAST





CT DOSE: 780.78 mGycm





HISTORY: Acute lower back pain with bilateral flank pain  lower back/flank pain 





TECHNIQUE: Multiaxial CT images of the abdomen and pelvis were performed


following the use of intravenous contrast.  A dose lowering technique was


utilized adhering to the principles of ALARA.





COMPARISON STUDY: CT abdomen and pelvis 11/2/2011.





FINDINGS: 


Mild dependent subsegmental bibasilar atelectasis. There are a few solid


pulmonary nodules of the right middle lobe measuring up to 2 mm. No pneumatosis


or pneumoperitoneum identified. Imaged inferior cardiac chambers are


unremarkable. Mitral annular calcifications.





Prior cholecystectomy. Mild intrahepatic biliary ductal dilation, likely


secondary to postcholecystectomy state. 5 mm low attenuating lesion of the


lateral left hepatic lobe is too small to characterize however suggests hepatic


cyst. Spleen, pancreas and adrenal glands are within normal limits. Low


attenuating lesions of the kidneys bilaterally are seen measuring up to 1.7 cm


within the superior pole left kidney suggesting cysts. Parenchymal thinning with


cortical scarring is noted within the inferior pole left kidney. No renal


calculi or hydronephrosis. Ureters and urinary bladder are within normal limits.


Uterus and adnexa are also unremarkable. Extensive mixed plaquing of the


abdominal aorta. Aneurysmal dilation of the abdominal aorta level of the kidneys


measures 2.7 x 3.3 cm. Aortobiiliac stent graft is noted within a fusiform


infrarenal abdominal aortic aneurysm measuring 3.0 cm. The right common iliac


artery is dilated measuring 3.3 cm. The left is dilated measuring 2.7 cm. Normal


caliber iliac vessels seen on comparison from 2011. No bulky adenopathy.





No bowel obstruction or focal bowel wall thickening identified. Colonic


diverticulosis without evidence of acute diverticulitis. Normal appendix.


Scattered air-fluid levels within nondilated loops of small bowel are likely


physiologic. Mild asymmetric atrophy of the right rectus musculature. Bones


appear intact. No compression deformity with kyphoplasty changes at L2.


Discectomy with posterior interbody kateryna and screw fusion at L4-L5.





IMPRESSION: 





1. No acute intra-abdominal or intrapelvic abnormality identified. No bowel


obstruction or focal bowel wall thickening.


2. Prior cholecystectomy.


3. Fusiform aneurysmal dilation of the suprarenal abdominal aorta measures up to


3.3 cm. Aortobiiliac stent graft is in place with progressive dilation of the


bilateral common iliac arteries, right greater than left as above. 


4. Colonic diverticulosis without evidence of acute diverticulitis.


5. Additional findings as above.











Electronically signed by:  Danish Olmos M.D.


4/19/2018 6:53 PM





Dictated Date/Time:  4/19/2018 6:44 PM








LUMBAR SPINE WITHOUT





CLINICAL HISTORY: 57 years-old Female presenting with lower back/ flank pain . 





TECHNIQUE: Multidetector CT of the lumbar spine was performed without the use of


intravenous contrast. IV contrast: None. A dose lowering technique was used


consistent with the principles of ALARA (as low as reasonably achievable).





COMPARISON: MR of the lumbar spine April 2012.





CT DOSE (mGy.cm): The estimated cumulative dose is 780.70.





FINDINGS:





 topogram: Cholecystectomy clips. Aortic endograft stent. Kyphoplasty.


Posterior lumbar fusion at L4-5.





Postsurgical changes of bilateral transpedicular screw and kateryna fixation of L4-5


with an interbody spacer. No hardware complication. Post procedural changes of


kyphoplasty of L2 with a chronic compression deformity. Minimal retropulsion of


the superior aspect of the L2 vertebral body without significant osseous spinal


canal narrowing. No scoliosis. Normal lumbar lordosis is essentially preserved.


No acute compression deformity or subluxation. Mild degenerative changes in the


lower lumbar spine. The spinal canal appears narrowed at L3-4 secondary to disc


bulge and epidural fat prominence. Neural foraminal narrowing suggested at


L5-S1.





Sacrum intact.





Visualized soft tissues demonstrate cortical atrophy of the lower pole of the


left kidney as well as a left renal cyst. An aortobiiliac endograft stent is


patent. Aneurysmal dilatation of the bilateral common iliac arteries, which


measure 3.4 cm on the right and 3.1 cm on the left. Paraspinal musculature


within normal limits.





IMPRESSION:


1.  No acute osseous injury of the lumbar spine.





2.  Postsurgical changes of posterior fusion of L4-5.





3.  Kyphoplasty changes of L2 with a chronic compression deformity.





4.  Within limitations of CT, suggestion of spinal canal narrowing at L3-4.





5.  Neural foraminal narrowing suggested at L5-S1.











Electronically signed by:  Gab Eller M.D.


4/19/2018 7:27 PM





Dictated Date/Time:  4/19/2018 7:22 PM








THORACIC SPINE 3 VIEWS ROUTINE





CLINICAL HISTORY: 57 years-old Female presenting with mid back pain. 





TECHNIQUE: 3 views of the thoracic spine were obtained. 





COMPARISON: CT from 4/29/2017.





FINDINGS:


No scoliosis. Normal thoracic kyphosis. Visualization of the upper thoracic


spine is slightly limited due to overlapping structures. Allowing for this,


vertebral bodies maintain normal height and alignment. Intervertebral disc


heights preserved. Degenerative changes evidenced by osteophytosis noted at the


thoracolumbar junction. Mild compression deformity in a mid thoracic vertebral


body with approximately 25% anterior vertebral body height loss. Visualized


portion of the cervical spine normal. Visualized portion of the thorax normal.





IMPRESSION:


Approximately 25% anterior vertebral body height loss of a midthoracic level


concerning for compression deformity. This is new since the prior CT.





The report will be called/faxed according to standard departmental protocol.











Electronically signed by:  Gab Eller M.D.


4/19/2018 5:46 PM





Dictated Date/Time:  4/19/2018 5:43 PM





Laboratory Results


4/19/18 17:15








Red Blood Count 5.05, Mean Corpuscular Volume 91.9, Mean Corpuscular Hemoglobin 

30.3, Mean Corpuscular Hemoglobin Concent 33.0, Mean Platelet Volume 9.5, 

Neutrophils (%) (Auto) 81.7, Lymphocytes (%) (Auto) 14.6, Monocytes (%) (Auto) 

3.1, Eosinophils (%) (Auto) 0.1, Basophils (%) (Auto) 0.2, Neutrophils # (Auto) 

13.73, Lymphocytes # (Auto) 2.46, Monocytes # (Auto) 0.52, Eosinophils # (Auto) 

0.02, Basophils # (Auto) 0.03





4/19/18 17:15

















Test


  4/19/18


14:56 4/19/18


17:15


 


Urine Color DK YELLOW  


 


Urine Appearance CLEAR (CLEAR)  


 


Urine pH 7.0 (4.5-7.5)  


 


Urine Specific Gravity


  1.019


(1.000-1.030) 


 


 


Urine Protein NEG (NEG)  


 


Urine Glucose (UA) NEG (NEG)  


 


Urine Ketones NEG (NEG)  


 


Urine Occult Blood TRACE (NEG)  


 


Urine Nitrite NEG (NEG)  


 


Urine Bilirubin NEG (NEG)  


 


Urine Urobilinogen NEG (NEG)  


 


Urine Leukocyte Esterase NEG (NEG)  


 


Urine WBC (Auto) 1-5 /hpf (0-5)  


 


Urine RBC (Auto)


  10-30 /hpf


(0-4) 


 


 


Urine Hyaline Casts (Auto) 1-5 /lpf (0-5)  


 


Urine Epithelial Cells (Auto)


  20-30 /lpf


(0-5) 


 


 


Urine Bacteria (Auto) NEG (NEG)  


 


White Blood Count


  


  16.81 K/uL


(4.8-10.8)


 


Red Blood Count


  


  5.05 M/uL


(4.2-5.4)


 


Hemoglobin


  


  15.3 g/dL


(12.0-16.0)


 


Hematocrit  46.4 % (37-47) 


 


Mean Corpuscular Volume


  


  91.9 fL


()


 


Mean Corpuscular Hemoglobin


  


  30.3 pg


(25-34)


 


Mean Corpuscular Hemoglobin


Concent 


  33.0 g/dl


(32-36)


 


Platelet Count


  


  381 K/uL


(130-400)


 


Mean Platelet Volume


  


  9.5 fL


(7.4-10.4)


 


Neutrophils (%) (Auto)  81.7 % 


 


Lymphocytes (%) (Auto)  14.6 % 


 


Monocytes (%) (Auto)  3.1 % 


 


Eosinophils (%) (Auto)  0.1 % 


 


Basophils (%) (Auto)  0.2 % 


 


Neutrophils # (Auto)


  


  13.73 K/uL


(1.4-6.5)


 


Lymphocytes # (Auto)


  


  2.46 K/uL


(1.2-3.4)


 


Monocytes # (Auto)


  


  0.52 K/uL


(0.11-0.59)


 


Eosinophils # (Auto)


  


  0.02 K/uL


(0-0.5)


 


Basophils # (Auto)


  


  0.03 K/uL


(0-0.2)


 


RDW Standard Deviation


  


  48.0 fL


(36.4-46.3)


 


RDW Coefficient of Variation


  


  14.3 %


(11.5-14.5)


 


Immature Granulocyte % (Auto)  0.3 % 


 


Immature Granulocyte # (Auto)


  


  0.05 K/uL


(0.00-0.02)


 


Anion Gap


  


  3.0 mmol/L


(3-11)


 


Est Creatinine Clear Calc


Drug Dose 


  54.5 ml/min 


 


 


Estimated GFR (


American) 


  54.5 


 


 


Estimated GFR (Non-


American 


  47.0 


 


 


BUN/Creatinine Ratio  12.9 (10-20) 


 


Calcium Level


  


  9.7 mg/dl


(8.5-10.1)


 


Total Bilirubin


  


  0.4 mg/dl


(0.2-1)


 


Direct Bilirubin


  


  < 0.1 mg/dl


(0-0.2)


 


Aspartate Amino Transf


(AST/SGOT) 


  45 U/L (15-37) 


 


 


Alanine Aminotransferase


(ALT/SGPT) 


  62 U/L (12-78) 


 


 


Alkaline Phosphatase


  


  58 U/L


()


 


Total Protein


  


  7.8 gm/dl


(6.4-8.2)


 


Albumin


  


  3.8 gm/dl


(3.4-5.0)


 


Lipase


  


  166 U/L


()





Laboratory results per my review.





ED Course


ED COURSE: 


Vital signs were reviewed and showed tachycardic vitals. 


The patients medical record was reviewed


The above diagnostic studies were performed and reviewed.


ED treatments and interventions as stated above. 





1659: The patient was evaluated in room A10. A complete history and physical 

examination was performed.





1828: I checked on the patient she is doing well. 





1948: I discussed the case with Dr. Alanis - Orthopedics. He states that the 

patient should follow-up in the outpatient setting. 





2005: Upon reevaluation, the patient is resting in bed.I discussed my findings 

with the patient and she understands and agrees with the treatment plan.   


Based on the patients age, coexisting illnesses, exam and lab findings the 

decision to treat as an outpatient was made.


The patient remained stable while under my care.


The patient appeared well at the time of discharge.





Medical Decision


Differential diagnoses includes but is not limited to lumbar radiculopathy, 

kidney stone, muscle strain, facture, cauda equina, mass, and disc herniation.





Patient is a 57-year-old female who presents the ER for back pain which is been 

present for the past week and a half.  She also admits to pressure with 

urination with cough present since December.  No focal weakness or numbness in 

her legs.  No fevers.  Does have a history of a chronic leukocytosis.  On 

steroids.  Patient had a leukocytosis of 16,000.  Upon review white count 

appears to trend around 13 17,000.  This appears to be consistent with her 

baseline.  BMP along with LFTs, bilirubin lipase is normal.  UA was negative.  

CT abdomen was benign.  CT lumbar spine was unremarkable.  X-rays of the 

thoracic spine do show an acute fracture with 25% loss and compression 

deformity.  Discussed with spine.  Patient will follow up with them as an 

outpatient.  Patient has been afebrile.  Did entertain possible abscess but 

with the persistent elevation in her white count which is been present for the 

past decade without fevers I did not feel it was prudent to MRI her spine at 

this point.  There is no focal deficit.  Patient was discharged to follow-up 

with orthopedics as an outpatient.  She does have pain meds at home. Discussed 

with Pt concerning signs and symptoms to watch out for. Pt was instructed to 

follow up with their PCP and discussed with the patient their option to return 

to the ED at anytime for persistent or worsening symptoms. The appropriate 

anticipatory guidance and out-patient management, including indications for 

return to the emergency department, were explained at length to the patient and 

understood.





Medication Reconcilliation


Current Medication List:  was personally reviewed by me





Blood Pressure Screening


Patient's blood pressure:  Normal blood pressure





Consults


Time Called:  1942


Consulting Physician:  Dr. Annabelle Restrepo


Returned Call:  1948


I discussed the case with Dr. Annabelle Restrepo. He states that the patient 

should follow-up in the outpatient setting.





Impression





 Primary Impression:  


 Thoracic spine fracture


 Additional Impressions:  


 Back pain


 Leukocytosis





Scribe Attestation


The scribe's documentation has been prepared under my direction and personally 

reviewed by me in its entirety. I confirm that the note above accurately 

reflects all work, treatment, procedures, and medical decision making performed 

by me.





Departure Information


Dispostion


Home / Self-Care





Referrals


Avery Merrill M.D. (PCP)





Forms


HOME CARE DOCUMENTATION FORM,                                                 

               IMPORTANT VISIT INFORMATION





Patient Instructions


My [facility]





Additional Instructions





Please follow up with your primary care doctor with in the next 24 hours.  Any 

worsening of your symptoms, please return to the ED immediately. This includes 

any fevers greater than 100.4, worsening pain, chest pain, shortness breath, 

persistent nausea, vomiting, unable to eat or drink, weakness or numbness in 

your groin or legs, unable to urinate, unable to move her bowels, or any other 

concerning signs or symptoms from your standpoint.





You were given medications during this visit that will inhibit your ability to 

drive, operate machinery and work. Please do NOT drive, operate machinery, 

drink alcohol or work for the next 12hrs.





Please follow-up with your spine surgeon within 1 week.





Please follow-up with your vascular surgeon as you have slight increase in 

dilation of your aorta within the next month.





Problem Qualifiers








 Primary Impression:  


 Thoracic spine fracture


 Encounter type:  initial encounter  Thoracic vertebra fracture level:  

unspecified thoracic vertebra  Fracture type:  closed  Fracture morphology:  

unspecified fracture morphology  Qualified Codes:  S22.009A - Unspecified 

fracture of unspecified thoracic vertebra, initial encounter for closed fracture


 Additional Impressions:  


 Back pain


 Back pain location:  back pain in unspecified location  Chronicity:  acute  

Back pain laterality:  midline  Qualified Codes:  M54.9 - Dorsalgia, unspecified


 Leukocytosis


 Leukocytosis type:  unspecified  Qualified Codes:  D72.829 - Elevated white 

blood cell count, unspecified